# Patient Record
Sex: MALE | Race: WHITE | NOT HISPANIC OR LATINO | Employment: OTHER | RURAL
[De-identification: names, ages, dates, MRNs, and addresses within clinical notes are randomized per-mention and may not be internally consistent; named-entity substitution may affect disease eponyms.]

---

## 2022-07-28 ENCOUNTER — OFFICE VISIT (OUTPATIENT)
Dept: DERMATOLOGY | Facility: CLINIC | Age: 67
End: 2022-07-28
Payer: COMMERCIAL

## 2022-07-28 VITALS — BODY MASS INDEX: 23.23 KG/M2 | RESPIRATION RATE: 18 BRPM | HEIGHT: 67 IN | WEIGHT: 148 LBS

## 2022-07-28 DIAGNOSIS — D48.5 NEOPLASM OF UNCERTAIN BEHAVIOR OF SKIN: ICD-10-CM

## 2022-07-28 DIAGNOSIS — Z80.8 FAMILY HISTORY OF MELANOMA: ICD-10-CM

## 2022-07-28 DIAGNOSIS — L82.1 SK (SEBORRHEIC KERATOSIS): ICD-10-CM

## 2022-07-28 DIAGNOSIS — D22.9 BENIGN NEVUS: ICD-10-CM

## 2022-07-28 DIAGNOSIS — B35.4 TINEA CORPORIS: ICD-10-CM

## 2022-07-28 DIAGNOSIS — L72.0 EPIDERMAL CYST: ICD-10-CM

## 2022-07-28 DIAGNOSIS — L57.8 OTHER SKIN CHANGES DUE TO CHRONIC EXPOSURE TO NONIONIZING RADIATION: Primary | ICD-10-CM

## 2022-07-28 LAB
BACTERIA HYPHAE, POC: POSITIVE
YEAST, POC: POSITIVE

## 2022-07-28 PROCEDURE — 3288F FALL RISK ASSESSMENT DOCD: CPT | Mod: CPTII,,, | Performed by: DERMATOLOGY

## 2022-07-28 PROCEDURE — 1100F PTFALLS ASSESS-DOCD GE2>/YR: CPT | Mod: CPTII,,, | Performed by: DERMATOLOGY

## 2022-07-28 PROCEDURE — 1100F PR PT FALLS ASSESS DOC 2+ FALLS/FALL W/INJURY/YR: ICD-10-PCS | Mod: CPTII,,, | Performed by: DERMATOLOGY

## 2022-07-28 PROCEDURE — 1160F PR REVIEW ALL MEDS BY PRESCRIBER/CLIN PHARMACIST DOCUMENTED: ICD-10-PCS | Mod: CPTII,,, | Performed by: DERMATOLOGY

## 2022-07-28 PROCEDURE — 99203 OFFICE O/P NEW LOW 30 MIN: CPT | Mod: 25,,, | Performed by: DERMATOLOGY

## 2022-07-28 PROCEDURE — 99203 PR OFFICE/OUTPT VISIT, NEW, LEVL III, 30-44 MIN: ICD-10-PCS | Mod: 25,,, | Performed by: DERMATOLOGY

## 2022-07-28 PROCEDURE — 3008F BODY MASS INDEX DOCD: CPT | Mod: CPTII,,, | Performed by: DERMATOLOGY

## 2022-07-28 PROCEDURE — 11102 TANGNTL BX SKIN SINGLE LES: CPT | Mod: ,,, | Performed by: DERMATOLOGY

## 2022-07-28 PROCEDURE — 1160F RVW MEDS BY RX/DR IN RCRD: CPT | Mod: CPTII,,, | Performed by: DERMATOLOGY

## 2022-07-28 PROCEDURE — 3008F PR BODY MASS INDEX (BMI) DOCUMENTED: ICD-10-PCS | Mod: CPTII,,, | Performed by: DERMATOLOGY

## 2022-07-28 PROCEDURE — 88305 TISSUE EXAM BY PATHOLOGIST: CPT | Mod: SUR | Performed by: DERMATOLOGY

## 2022-07-28 PROCEDURE — 1159F MED LIST DOCD IN RCRD: CPT | Mod: CPTII,,, | Performed by: DERMATOLOGY

## 2022-07-28 PROCEDURE — 88305 TISSUE EXAM BY PATHOLOGIST: CPT | Mod: 26,,, | Performed by: PATHOLOGY

## 2022-07-28 PROCEDURE — 11102 PR TANGENTIAL BIOPSY, SKIN, SINGLE LESION: ICD-10-PCS | Mod: ,,, | Performed by: DERMATOLOGY

## 2022-07-28 PROCEDURE — 3288F PR FALLS RISK ASSESSMENT DOCUMENTED: ICD-10-PCS | Mod: CPTII,,, | Performed by: DERMATOLOGY

## 2022-07-28 PROCEDURE — 88305 PATHOLOGY, DERMATOLOGY: ICD-10-PCS | Mod: 26,,, | Performed by: PATHOLOGY

## 2022-07-28 PROCEDURE — 1159F PR MEDICATION LIST DOCUMENTED IN MEDICAL RECORD: ICD-10-PCS | Mod: CPTII,,, | Performed by: DERMATOLOGY

## 2022-07-28 RX ORDER — METFORMIN HYDROCHLORIDE 500 MG/1
TABLET, EXTENDED RELEASE ORAL
COMMUNITY
Start: 2022-04-28

## 2022-07-28 RX ORDER — BENZONATATE 100 MG/1
CAPSULE ORAL
COMMUNITY
Start: 2022-04-28

## 2022-07-28 RX ORDER — CITALOPRAM 20 MG/1
TABLET, FILM COATED ORAL
COMMUNITY
Start: 2022-04-28

## 2022-07-28 RX ORDER — NYSTATIN 100000 U/G
CREAM TOPICAL
COMMUNITY
Start: 2022-04-28

## 2022-07-28 RX ORDER — ALBUTEROL SULFATE 90 UG/1
AEROSOL, METERED RESPIRATORY (INHALATION)
COMMUNITY
Start: 2022-04-28

## 2022-07-28 RX ORDER — MONTELUKAST SODIUM 10 MG/1
TABLET ORAL
COMMUNITY
Start: 2022-04-28

## 2022-07-28 RX ORDER — METOPROLOL SUCCINATE 100 MG/1
TABLET, EXTENDED RELEASE ORAL
COMMUNITY
Start: 2022-04-28

## 2022-07-28 RX ORDER — TERBINAFINE HYDROCHLORIDE 250 MG/1
TABLET ORAL
Qty: 14 TABLET | Refills: 0 | Status: SHIPPED | OUTPATIENT
Start: 2022-07-28

## 2022-07-28 RX ORDER — CLOTRIMAZOLE AND BETAMETHASONE DIPROPIONATE 10; .64 MG/G; MG/G
CREAM TOPICAL
Qty: 45 G | Refills: 1 | Status: SHIPPED | OUTPATIENT
Start: 2022-07-28

## 2022-07-28 RX ORDER — ISOSORBIDE MONONITRATE 60 MG/1
TABLET, EXTENDED RELEASE ORAL
COMMUNITY
Start: 2022-04-28

## 2022-07-28 RX ORDER — BENZONATATE 200 MG/1
CAPSULE ORAL
COMMUNITY

## 2022-07-28 RX ORDER — PANTOPRAZOLE SODIUM 40 MG/1
TABLET, DELAYED RELEASE ORAL
COMMUNITY
Start: 2021-10-28

## 2022-07-28 RX ORDER — AMLODIPINE BESYLATE 2.5 MG/1
TABLET ORAL
COMMUNITY
Start: 2022-04-28

## 2022-07-28 RX ORDER — GABAPENTIN 100 MG/1
CAPSULE ORAL
COMMUNITY
Start: 2022-04-28

## 2022-07-28 RX ORDER — TAMSULOSIN HYDROCHLORIDE 0.4 MG/1
CAPSULE ORAL
COMMUNITY
Start: 2022-04-28

## 2022-07-28 RX ORDER — SIMVASTATIN 40 MG/1
TABLET, FILM COATED ORAL
COMMUNITY
Start: 2022-04-28

## 2022-07-28 RX ORDER — SILDENAFIL 100 MG/1
TABLET, FILM COATED ORAL
COMMUNITY
Start: 2022-04-28

## 2022-07-28 RX ORDER — POTASSIUM CHLORIDE 1500 MG/1
TABLET, EXTENDED RELEASE ORAL
COMMUNITY
Start: 2022-04-28

## 2022-07-28 NOTE — PROGRESS NOTES
[facility]   Maribel Delong MD    Patient Name: Giovanni Self  Patient YOB: 1955   Date of Service: 7/28/22    CC: Above the Waist Skin Exam    HPI: Giovanni Self is a 66 y.o. male presents today for an above the waist skin exam.  Patient has not been seen by a dermatologist in the past and dermatologic history includes no hx of skin cancer. Patient is concerned today about a lesion located on the back.  It has been present for 3 month(s). It has not been treated in the past.  Patient is also concerned about rash located on bilateral leg. Previous treatment includes OTC hydrocortisone, calomine lotion and Lotromin . .    History reviewed. No pertinent past medical history.  History reviewed. No pertinent surgical history.  Review of patient's allergies indicates:  No Known Allergies    Current Outpatient Medications:     albuterol (VENTOLIN HFA) 90 mcg/actuation inhaler, Ventolin  (90 Base) MCG/ACT Inhalation Aerosol Solution QTY: 1 gram Days: 30 Refills: 11  Written: 04/28/22 Patient Instructions: 2 puffs qid prn, Disp: , Rfl:     amLODIPine (NORVASC) 2.5 MG tablet, Norvasc 2.5 MG Oral Tablet QTY: 90 tablet Days: 90 Refills: 3  Written: 04/28/22 Patient Instructions: Norvasc 2.5 mg oral tablet Dispense: 90 - take 1 tablet (2.5 mg) by oral route once daily for 90 days tablet, Disp: , Rfl:     benzonatate (TESSALON) 100 MG capsule, Benzonatate 100 MG Oral Capsule QTY: 0 capsule Days: 0 Refills: 0  Written: 04/28/22 Patient Instructions: prn, Disp: , Rfl:     citalopram (CELEXA) 20 MG tablet, Citalopram Hydrobromide 20 MG Oral Tablet QTY: 90 tablet Days: 90 Refills: 3  Written: 04/28/22 Patient Instructions: citalopram 20 mg oral tablet Dispense: 90 - TAKE 1 TABLET BY MOUTH DAILY Tablet, Disp: , Rfl:     gabapentin (NEURONTIN) 100 MG capsule, Gabapentin 100 MG Oral Capsule QTY: 30 capsule Days: 30 Refills: 11  Written: 04/28/22 Patient Instructions: one daily, Disp: ,  Rfl:     isosorbide mononitrate (IMDUR) 60 MG 24 hr tablet, Isosorbide Mononitrate ER 60 MG Oral Tablet Extended Release 24 Hour QTY: 90 tablet Days: 90 Refills: 3  Written: 04/28/22 Patient Instructions: isosorbide mononitrate 60 mg oral tablet extended release 24 hr Dispense: 90 - TAKE 1 TABLET BY MOUTH DAILY EVERY MORNING Tablet, Disp: , Rfl:     metFORMIN (GLUCOPHAGE-XR) 500 MG ER 24hr tablet, metFORMIN HCl  MG Oral Tablet Extended Release 24 Hour QTY: 120 tablet Days: 30 Refills: 11  Written: 04/28/22 Patient Instructions: metformin 500 mg oral tablet extended release 24 hr Dispense: 120 - TAKE 4 TABLETS BY MOUTH DAILY WITH EVENING MEAL Tablet, Disp: , Rfl:     metoprolol succinate (TOPROL-XL) 100 MG 24 hr tablet, Metoprolol Succinate  MG Oral Tablet Extended Release 24 Hour QTY: 90 tablet Days: 90 Refills: 3  Written: 04/28/22 Patient Instructions: metoprolol succinate 100 mg oral tablet extended release 24 hr Dispense: 90 - TAKE 1 TABLET BY MOUTH DAILY Tablet, Disp: , Rfl:     montelukast (SINGULAIR) 10 mg tablet, Singulair 10 MG Oral Tablet QTY: 90 tablet Days: 90 Refills: 3  Written: 04/28/22 Patient Instructions: one daily, Disp: , Rfl:     nystatin (MYCOSTATIN) cream, Nystatin 658282 UNIT/GM External Cream QTY: 0  Days: 0 Refills: 0  Written: 04/28/22 Patient Instructions: tid, Disp: , Rfl:     pantoprazole (PROTONIX) 40 MG tablet, Protonix 40 MG Oral Tablet Delayed Release QTY: 90 tablet Days: 90 Refills: 3  Written: 10/28/21 Patient Instructions: one daily, Disp: , Rfl:     potassium chloride (K-TAB) 20 mEq, Potassium Chloride ER 20 MEQ Oral Tablet Extended Release QTY: 30 tablet Days: 30 Refills: 11  Written: 04/28/22 Patient Instructions: one dialy, Disp: , Rfl:     sildenafiL (VIAGRA) 100 MG tablet, Sildenafil Citrate 100 MG Oral Tablet QTY: 0 tablet Days: 0 Refills: 0  Written: 04/28/22 Patient Instructions: prn, Disp: , Rfl:     simvastatin (ZOCOR) 40 MG tablet, Simvastatin 40  MG Oral Tablet QTY: 45 tablet Days: 90 Refills: 3  Written: 04/28/22 Patient Instructions: as directed simvastatin 40 mg oral tablet Dispense: 45 - take 0.5 tablet by oral route once a day (at bedtime) for 90 days tablet, Disp: , Rfl:     tamsulosin (FLOMAX) 0.4 mg Cap, Tamsulosin HCl 0.4 MG Oral Capsule QTY: 180 capsule Days: 90 Refills: 3  Written: 04/28/22 Patient Instructions: tamsulosin 0.4 mg oral capsule bid, Disp: , Rfl:     benzonatate (TESSALON) 200 MG capsule, , Disp: , Rfl:     clotrimazole-betamethasone 1-0.05% (LOTRISONE) cream, Apply to rash on legs BID, tapering with improvement, Disp: 45 g, Rfl: 1    terbinafine HCL (LAMISIL) 250 mg tablet, Take one 250 mg tablet PO daily x2 weeks, Disp: 14 tablet, Rfl: 0    ROS: A focused review of systems was obtained and negative.     Exam: A sun exposed skin exam was performed including scalp, hair, face, neck, chest, back, abdomen, right arm, left arm, right hand, left hand, and nails.  All areas examined were normal expect as per below in assessment and plan.  General Appearance of the patient is well developed and well nourished.  Orientation: alert and oriented x 3.  Mood and affect: pleasant.    Assessment:   The primary encounter diagnosis was Other skin changes due to chronic exposure to nonionizing radiation. Diagnoses of SK (seborrheic keratosis), Benign nevus, Neoplasm of uncertain behavior of skin, Epidermal cyst, Family history of melanoma, and Tinea corporis were also pertinent to this visit.    Plan:   Medications Ordered This Encounter   Medications    clotrimazole-betamethasone 1-0.05% (LOTRISONE) cream     Sig: Apply to rash on legs BID, tapering with improvement     Dispense:  45 g     Refill:  1    terbinafine HCL (LAMISIL) 250 mg tablet     Sig: Take one 250 mg tablet PO daily x2 weeks     Dispense:  14 tablet     Refill:  0     Seborrheic Keratosis (L82.1)  - Stuck-on, warty, greasy brown papule with pseudo-horn cysts scattered on the  trunk and extremities    Plan: Counseling.  I counseled the patient regarding the following:  Skin Care: Seborrheic Keratoses are benign. No treatment is necessary.  Expectations: Seborrheic Keratoses are benign warty growths. Patients get more of them as they age    Plan: Reassurance    Neoplasm of Uncertain Behavior (D48.5)  - pink plaque located on the left posterior shoulder  Ddx includes: BCC    Plan: Counseling.  I counseled the patient regarding the following:  Instructions: Neoplasms of Uncertain Behavior can be observed, biopsied or surgically removed depending on the  level of clinical suspicion.  Instructions: Neoplasms of Uncertain Behavior can be observed, biopsied or surgically removed depending on the  level of clinical suspicion.  Contact Office if: patient develops any new lesions that fail to heal, ulcerate or bleed.    Plan: Biopsy by Shave Method.  Location (1): left posterior shoulder  Written consent was obtained and risks were reviewed including but not  limited to scarring, infection, bleeding, scabbing, incomplete removal, nerve damage and allergy to anesthesia.  The area was prepped with Chloraprep. Local anesthesia was obtained with approximately 0.5cc of 1% lidocaine  with epinephrine. A biopsy by shave method to the level of the dermis (sent for H and E) was performed using  a Dermablade on the above location. Aluminum chloride was used for hemostasis. Following the biopsy  Petrolatum and a bandage were applied. Patient will be notified of biopsy results. However, patient instructed to  call the office if not contacted within 2 weeks.    Tinea Corporis   - erythematous scaly plaques on L leg     Plan:   Clotrimazole-betamethasone bid until resolved   Plan: KOH Prep  Location: left leg  A KOH prep was ordered and evaluated from the above location. A 15-blade scalpel was used to scrape the skin. The skin scrapings were placed on a glass slide, covered with a coverslip and a KOH solution  was applied. Examination of the slide showed: positive.    - will treat with PO lamisil and betamethasone-clotrimazole as he has failed OTC fungal creams    Epidermal Cyst  - subcutaneous cyst with prominent follicular pore located on the right flank    Plan: Counseling  I counseled the patient regarding the following:  Skin Care: Epidermal Cysts require no specific skin care.  Expectations: Epidermal Cysts are benign sacs within the skin that contain keratin.  Contact Office if: Epidermal Cysts rupture or become red and tender.  Family history of malignant melanoma  - no suspicious lesions noted    Plan: Counseling  I counseled the patient regarding the following:  Skin Care: Patients with a family history of melanoma should wear broad spectrum sunscreen and sun protective clothing.  Expectations: Patients with a family history of melanoma from a 1st degree relative have a higher risk of developing a melanoma compared with the rest of the population. Monthly self-skin checks should be performed to monitor for any moles that change in size, shape or color, itch burn or bleed.  Contact Office if: Patient notices any new or changing moles.    Other Skin Changes Due to Chronic Exposure of Nonionizing Radiation (L57.8)    Plan: Monitoring.     Plan: Sunscreen Recommendations.  I recommended a broad spectrum sunscreen with a SPF of 30 or higher. I explained that SPF 30 sunscreens block approximately 97 percent of the  sun's harmful rays. Sunscreens should be applied at least 15 minutes prior to expected sun exposure and then every 2 hours after that as long as  sun exposure continues. If swimming or exercising sunscreen should be reapplied every 45 minutes to an hour after getting wet or sweating. One  ounce, or the equivalent of a shot glass full of sunscreen, is adequate to protect the skin not covered by a bathing suit. I also recommended a lip  balm with a sunscreen as well. Sun protective clothing can be used in  lieu of sunscreen but must be worn the entire time you are exposed to the  sun's rays.    - areas on PET scan from Dr. Olivares reviewed and the only relevant finding is on the left posterior shoulder (potential BCC)  - will treat with EDC if BCC on path but plan on PRN follow ups as pt is not seeking chemotherapy for active lung cancer     Follow up if symptoms worsen or fail to improve.    Maribel Delong MD

## 2022-07-28 NOTE — PATIENT INSTRUCTIONS
Sunscreen Recommendations  I recommended a broad spectrum sunscreen with a SPF of 30 or higher that is water-resistant. SPF 30 sunscreens block approximately 97 percent of the sun's harmful rays.   Sunscreens should be applied at least 15 minutes prior to expected sun exposure and then every 90 minutes after that as long as sun exposure continues.   If swimming or exercising sunscreen should be reapplied every 45 minutes to an hour after getting wet or sweating.  One ounce, or the equivalent of a shot glass full of sunscreen, is adequate to protect the skin not covered by a bathing suit.   I also recommended a lip balm with a sunscreen as well.   Healthy Sun Protective Behaviors  Sun protective clothing can be used in lieu of sunscreen but must be worn the entire time you are exposed to the sun's rays.  Seek shade between 10 a.m. and 2 p.m.  Use extra caution near water, snow, or sand as they reflect sun rays  Avoid tanning beds and consider sunless self-tanning products instead  Perform monthly self skin exams  Biopsy Site Care  Starting tomorrow you may shower and wash the area with dial antibacterial soap  Pat dry and apply vaseline and a bandaid  Perform this routine for three days  The area will be irritated, sore, slightly red, and may itch, sting, or burn  Do not apply make-up to the area until it is healed  There will be a scar  The area will take 1-2 weeks to heal  No soaking in the tub or hot tub for one week

## 2022-07-28 NOTE — LETTER
July 28, 2022        Jean Olivares MD  [address]  [address]  [place] [address]             [facility]  [address] [place] [address] A  [place] [address]  Phone: [phone]  Fax: [phone]   Patient: Giovanni Self   MR Number: [id number]   YOB: 1955   Date of Visit: 7/28/2022       Dear Dr. Olivares:    Thank you for referring Giovanni Self to me for evaluation. Attached you will find relevant portions of my assessment and plan of care.    If you have questions, please do not hesitate to call me. I look forward to following Giovanni Self along with you.    Sincerely,      Maribel Delong MD            CC  No Recipients    Enclosure

## 2022-08-01 LAB
DHEA SERPL-MCNC: NORMAL
ESTROGEN SERPL-MCNC: NORMAL PG/ML
INSULIN SERPL-ACNC: NORMAL U[IU]/ML
LAB AP GROSS DESCRIPTION: NORMAL
LAB AP LABORATORY NOTES: NORMAL
LAB AP SPEC A DDX: NORMAL
LAB AP SPEC A MORPHOLOGY: NORMAL
LAB AP SPEC A PROCEDURE: NORMAL
T3RU NFR SERPL: NORMAL %

## 2023-12-07 PROCEDURE — 88305 TISSUE EXAM BY PATHOLOGIST: CPT | Mod: TC,SUR | Performed by: DERMATOLOGY

## 2023-12-07 PROCEDURE — 88305 PATHOLOGY, DERMATOLOGY: ICD-10-PCS | Mod: 26,,, | Performed by: PATHOLOGY

## 2023-12-07 PROCEDURE — 88305 TISSUE EXAM BY PATHOLOGIST: CPT | Mod: 26,,, | Performed by: PATHOLOGY

## 2023-12-08 ENCOUNTER — LAB REQUISITION (OUTPATIENT)
Dept: LAB | Facility: HOSPITAL | Age: 68
End: 2023-12-08
Attending: DERMATOLOGY
Payer: COMMERCIAL

## 2023-12-08 DIAGNOSIS — D49.2 NEOPLASM OF UNSPECIFIED BEHAVIOR OF BONE, SOFT TISSUE, AND SKIN: ICD-10-CM

## 2023-12-11 LAB
ESTROGEN SERPL-MCNC: NORMAL PG/ML
INSULIN SERPL-ACNC: NORMAL U[IU]/ML
LAB AP GROSS DESCRIPTION: NORMAL
LAB AP LABORATORY NOTES: NORMAL
LAB AP SPEC A DDX: NORMAL
LAB AP SPEC A MORPHOLOGY: NORMAL
LAB AP SPEC A PROCEDURE: NORMAL
T3RU NFR SERPL: NORMAL %